# Patient Record
Sex: MALE | Race: BLACK OR AFRICAN AMERICAN | NOT HISPANIC OR LATINO | Employment: OTHER | URBAN - METROPOLITAN AREA
[De-identification: names, ages, dates, MRNs, and addresses within clinical notes are randomized per-mention and may not be internally consistent; named-entity substitution may affect disease eponyms.]

---

## 2021-10-12 ENCOUNTER — PATIENT OUTREACH (OUTPATIENT)
Dept: ADMINISTRATIVE | Facility: HOSPITAL | Age: 66
End: 2021-10-12

## 2021-10-12 PROBLEM — I10 PRIMARY HYPERTENSION: Status: ACTIVE | Noted: 2021-10-12

## 2021-10-26 PROBLEM — I48.11 LONGSTANDING PERSISTENT ATRIAL FIBRILLATION: Status: ACTIVE | Noted: 2021-10-26

## 2021-10-26 PROBLEM — I73.9 PAD (PERIPHERAL ARTERY DISEASE): Status: ACTIVE | Noted: 2021-10-26

## 2021-10-26 PROBLEM — I70.201: Status: ACTIVE | Noted: 2021-10-26

## 2021-10-26 PROBLEM — I63.9 CVA (CEREBRAL VASCULAR ACCIDENT): Status: ACTIVE | Noted: 2021-10-26

## 2021-10-26 PROBLEM — F19.10 POLYSUBSTANCE ABUSE: Status: ACTIVE | Noted: 2021-10-26

## 2021-10-26 PROBLEM — M10.9 GOUT: Status: ACTIVE | Noted: 2021-10-26

## 2021-10-26 PROBLEM — R56.9 SEIZURE: Status: ACTIVE | Noted: 2021-10-26

## 2021-12-03 PROBLEM — I70.25 ATHEROSCLEROSIS OF NATIVE ARTERY OF EXTREMITY WITH ULCERATION: Status: ACTIVE | Noted: 2021-10-26

## 2021-12-03 PROBLEM — S98.132A AMPUTATION OF TOE OF LEFT FOOT: Status: ACTIVE | Noted: 2021-12-03

## 2023-03-13 ENCOUNTER — PATIENT MESSAGE (OUTPATIENT)
Dept: ADMINISTRATIVE | Facility: HOSPITAL | Age: 68
End: 2023-03-13

## 2023-03-13 ENCOUNTER — PATIENT OUTREACH (OUTPATIENT)
Dept: ADMINISTRATIVE | Facility: HOSPITAL | Age: 68
End: 2023-03-13

## 2023-06-02 ENCOUNTER — PATIENT OUTREACH (OUTPATIENT)
Dept: ADMINISTRATIVE | Facility: HOSPITAL | Age: 68
End: 2023-06-02

## 2023-07-14 ENCOUNTER — PES CALL (OUTPATIENT)
Dept: ADMINISTRATIVE | Facility: CLINIC | Age: 68
End: 2023-07-14

## 2023-08-25 ENCOUNTER — EXTERNAL HOSPITAL ADMISSION (OUTPATIENT)
Dept: ADMINISTRATIVE | Facility: CLINIC | Age: 68
End: 2023-08-25

## 2023-08-25 ENCOUNTER — PATIENT OUTREACH (OUTPATIENT)
Dept: ADMINISTRATIVE | Facility: CLINIC | Age: 68
End: 2023-08-25

## 2023-08-25 NOTE — PROGRESS NOTES
PATIENT MAY HAVE TO RESCHEDULE DUE TO LACK OF TRANSPORTATION.    C3 nurse spoke with Albaro Elias ( daughter)  for a TCC post hospital discharge follow up call. The patient has a scheduled HOSFU appointment with DUSTIN Melgar on 08/25/2023 @ 1600.

## 2023-08-25 NOTE — PROGRESS NOTES
C3 nurse attempted to contact patient. The following occurred:   C3 nurse attempted to contact Albaro Elias ( daughter)  for a TCC post hospital discharge follow up call. The patient is unable to conduct the call @ this time. The patient requested a callback.    The patient has a scheduled HOSFU appointment with Ciara Anderson on 08/25/2023 @ 1600. Message sent to Physician staff.

## 2023-10-17 PROBLEM — G40.909 SEIZURE DISORDER: Status: ACTIVE | Noted: 2021-10-26

## 2023-10-17 PROBLEM — E11.49 TYPE 2 DIABETES MELLITUS WITH NEUROLOGIC COMPLICATION, WITHOUT LONG-TERM CURRENT USE OF INSULIN: Status: ACTIVE | Noted: 2023-10-17

## 2023-10-20 ENCOUNTER — TELEPHONE (OUTPATIENT)
Dept: ADMINISTRATIVE | Facility: CLINIC | Age: 68
End: 2023-10-20

## 2023-10-20 NOTE — TELEPHONE ENCOUNTER
C3 nurse successfully contacted and spoke with Albaro Elias (Daughter) after patient's daughter (Farheen) had selected option 2 on PD tracker requesting assistance with patient's medications. Ms. Zhong is expressing concerns that she was not informed of any new medications that the patient should have been discharged home with as she was not physically present upon patient's hospital discharge date. C3 nurse was able to assist with education requested regarding patient's new medications and that all prescriptions were sent to the patient's pharmacy on file. Patient's daughter verbally stated she would ensure the patient's medications were picked up for the patient to begin the medication regimen. The patient's daughter denied any further needs at this time, was advised to contact [facility] for any immediate assistance.

## 2023-10-23 PROBLEM — M25.422 PAIN AND SWELLING OF LEFT ELBOW: Status: ACTIVE | Noted: 2023-10-23

## 2023-10-23 PROBLEM — J44.9 COPD (CHRONIC OBSTRUCTIVE PULMONARY DISEASE): Status: ACTIVE | Noted: 2023-10-23

## 2023-10-23 PROBLEM — E78.5 HLD (HYPERLIPIDEMIA): Status: RESOLVED | Noted: 2023-10-23 | Resolved: 2023-10-23

## 2023-10-23 PROBLEM — I73.9 PAD (PERIPHERAL ARTERY DISEASE): Status: ACTIVE | Noted: 2021-10-26

## 2023-10-23 PROBLEM — F10.10 ALCOHOL ABUSE: Status: ACTIVE | Noted: 2023-10-23

## 2023-10-23 PROBLEM — E78.5 HLD (HYPERLIPIDEMIA): Status: ACTIVE | Noted: 2023-10-23

## 2023-10-23 PROBLEM — I87.2 CHRONIC VENOUS STASIS DERMATITIS OF BOTH LOWER EXTREMITIES: Status: ACTIVE | Noted: 2023-10-23

## 2023-10-23 PROBLEM — M25.522 PAIN AND SWELLING OF LEFT ELBOW: Status: ACTIVE | Noted: 2023-10-23

## 2023-10-23 PROBLEM — Z72.0 TOBACCO ABUSE: Status: ACTIVE | Noted: 2023-10-23

## 2023-10-24 PROBLEM — E78.5 HYPERLIPIDEMIA: Status: ACTIVE | Noted: 2023-10-24

## 2023-10-24 PROBLEM — R00.0 TACHYCARDIA: Status: ACTIVE | Noted: 2023-10-24

## 2023-10-25 PROBLEM — M25.422 PAIN AND SWELLING OF LEFT ELBOW: Status: RESOLVED | Noted: 2023-10-23 | Resolved: 2023-10-25

## 2023-10-25 PROBLEM — M25.522 PAIN AND SWELLING OF LEFT ELBOW: Status: RESOLVED | Noted: 2023-10-23 | Resolved: 2023-10-25

## 2024-01-29 PROBLEM — I63.9 CVA (CEREBRAL VASCULAR ACCIDENT): Status: RESOLVED | Noted: 2021-10-26 | Resolved: 2024-01-29
